# Patient Record
Sex: FEMALE | Race: OTHER | Employment: UNEMPLOYED | URBAN - METROPOLITAN AREA
[De-identification: names, ages, dates, MRNs, and addresses within clinical notes are randomized per-mention and may not be internally consistent; named-entity substitution may affect disease eponyms.]

---

## 2017-12-11 ENCOUNTER — HOSPITAL ENCOUNTER (OUTPATIENT)
Dept: MAMMOGRAPHY | Age: 42
Discharge: HOME | End: 2017-12-11

## 2017-12-11 DIAGNOSIS — Z12.31 VISIT FOR SCREENING MAMMOGRAM: ICD-10-CM

## 2017-12-11 PROCEDURE — 77067 SCR MAMMO BI INCL CAD: CPT

## 2018-12-01 ENCOUNTER — HOSPITAL ENCOUNTER (EMERGENCY)
Age: 43
Discharge: HOME | End: 2018-12-01
Attending: EMERGENCY MEDICINE
Payer: SELF-PAY

## 2018-12-01 ENCOUNTER — APPOINTMENT (OUTPATIENT)
Dept: GENERAL RADIOLOGY | Age: 43
End: 2018-12-01
Payer: SELF-PAY

## 2018-12-01 ENCOUNTER — APPOINTMENT (OUTPATIENT)
Dept: GENERAL RADIOLOGY | Age: 43
End: 2018-12-01
Attending: EMERGENCY MEDICINE
Payer: SELF-PAY

## 2018-12-01 VITALS
HEART RATE: 65 BPM | SYSTOLIC BLOOD PRESSURE: 148 MMHG | DIASTOLIC BLOOD PRESSURE: 68 MMHG | TEMPERATURE: 98.2 F | RESPIRATION RATE: 16 BRPM | OXYGEN SATURATION: 96 %

## 2018-12-01 DIAGNOSIS — S93.402A SPRAIN OF LEFT ANKLE, UNSPECIFIED LIGAMENT, INITIAL ENCOUNTER: ICD-10-CM

## 2018-12-01 DIAGNOSIS — S39.012A LUMBAR STRAIN, INITIAL ENCOUNTER: ICD-10-CM
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
## 2018-12-01 DIAGNOSIS — S83.92XA SPRAIN OF LEFT KNEE, UNSPECIFIED LIGAMENT, INITIAL ENCOUNTER: ICD-10-CM

## 2018-12-01 DIAGNOSIS — S70.02XA CONTUSION OF LEFT HIP, INITIAL ENCOUNTER: ICD-10-CM

## 2018-12-01 PROCEDURE — 72100 X-RAY EXAM L-S SPINE 2/3 VWS: CPT

## 2018-12-01 PROCEDURE — 73610 X-RAY EXAM OF ANKLE: CPT

## 2018-12-01 PROCEDURE — 73562 X-RAY EXAM OF KNEE 3: CPT

## 2018-12-01 PROCEDURE — 99283 EMERGENCY DEPT VISIT LOW MDM: CPT | Performed by: PHYSICIAN ASSISTANT

## 2018-12-01 PROCEDURE — 75810000053 HC SPLINT APPLICATION: Performed by: PHYSICIAN ASSISTANT

## 2018-12-01 PROCEDURE — 73502 X-RAY EXAM HIP UNI 2-3 VIEWS: CPT

## 2018-12-01 RX ORDER — DIFLUNISAL 500 MG/1
500 TABLET, FILM COATED ORAL 2 TIMES DAILY
Qty: 20 TAB | Refills: 0 | Status: SHIPPED | OUTPATIENT
Start: 2018-12-01 | End: 2020-12-06 | Stop reason: ALTCHOICE

## 2018-12-01 NOTE — ED NOTES
Called radiology and spoke to brendan who states he will put in to have the pt to xray first and will then send to V02.

## 2018-12-01 NOTE — ED TRIAGE NOTES
Herotainment service used for . Patient states she was picking up leaves last Saturday and fell into a hole. Reports left foot pain. States she is unable to walk. Points to ankle area and states she can feel a bone poking out. Bruising noted to left lower leg. Patient states she has pain all on left side where she fell.

## 2018-12-01 NOTE — ED NOTES
I have reviewed discharge instructions with the patient and spouse. The patient and spouse verbalized understanding. Patient left ED via Discharge Method: ambulatory to Home with . Opportunity for questions and clarification provided. Patient given 1 scripts. To continue your aftercare when you leave the hospital, you may receive an automated call from our care team to check in on how you are doing. This is a free service and part of our promise to provide the best care and service to meet your aftercare needs.  If you have questions, or wish to unsubscribe from this service please call [phone]. Thank you for Choosing our [employer] [facility] Emergency Department. Geeta Jackson

## 2018-12-01 NOTE — ED PROVIDER NOTES
Patient was raking leaves in her yard when she stepped with her left foot into a hole that she did not realize was there, twisted her ankle and fell on her left side. She has had pain to her left ankle, knee, hip and low back since then. She states she can bear weight but it is painful by the end of the day. She has noticed swelling in those areas as well. No other injuries or complaints. Her  brought her in. She is able to ambulate. The history is provided by the patient. The history is limited by a language barrier. A  was used. Ankle Pain This is a new problem. Episode onset: Last saturday, one week ago. The problem occurs constantly. The problem has been gradually worsening. The pain is present in the left knee, left ankle and left hip (low back). The quality of the pain is described as aching. The pain is at a severity of 4/10. The pain is moderate. Associated symptoms include limited range of motion, stiffness and back pain. Pertinent negatives include no numbness, no tingling, no itching and no neck pain. The symptoms are aggravated by activity. She has tried nothing for the symptoms. There has been a history of trauma. Past Medical History:  
Diagnosis Date  Endocrine disease  Hypertension No past surgical history on file. No family history on file. Social History Socioeconomic History  Marital status:  Spouse name: Not on file  Number of children: Not on file  Years of education: Not on file  Highest education level: Not on file Social Needs  Financial resource strain: Not on file  Food insecurity - worry: Not on file  Food insecurity - inability: Not on file  Transportation needs - medical: Not on file  Transportation needs - non-medical: Not on file Occupational History  Not on file Tobacco Use  Smoking status: Never Smoker Substance and Sexual Activity  Alcohol use:  No  
  Drug use: Not on file  Sexual activity: Not on file Other Topics Concern  Not on file Social History Narrative  Not on file ALLERGIES: Patient has no known allergies. Review of Systems Constitutional: Negative. HENT: Negative. Eyes: Negative. Respiratory: Negative. Cardiovascular: Negative. Gastrointestinal: Negative. Genitourinary: Negative. Musculoskeletal: Positive for back pain and stiffness. Negative for neck pain. Left hip, knee and ankle pain Skin: Negative. Negative for itching. Neurological: Negative. Negative for tingling and numbness. Psychiatric/Behavioral: Negative. All other systems reviewed and are negative. Vitals:  
 12/01/18 1614 12/01/18 1620 BP: 148/68 148/68 Pulse: 65 65 Resp: 16 16 Temp: 98.2 °F (36.8 °C) 98.2 °F (36.8 °C) SpO2: 96% 96% Physical Exam  
Constitutional: She is oriented to person, place, and time. She appears well-developed and well-nourished. HENT:  
Head: Normocephalic and atraumatic. Right Ear: External ear normal.  
Left Ear: External ear normal.  
Nose: Nose normal.  
Mouth/Throat: Oropharynx is clear and moist.  
Eyes: Conjunctivae and EOM are normal. Pupils are equal, round, and reactive to light. Neck: Normal range of motion. Neck supple. Cardiovascular: Normal rate, regular rhythm, normal heart sounds and intact distal pulses. Pulmonary/Chest: Effort normal and breath sounds normal.  
Abdominal: Soft. Bowel sounds are normal.  
Musculoskeletal:  
     Legs: 
Neurological: She is alert and oriented to person, place, and time. She has normal strength and normal reflexes. No cranial nerve deficit or sensory deficit. She displays a negative Romberg sign. GCS eye subscore is 4. GCS verbal subscore is 5. GCS motor subscore is 6. Reflex Scores: 
     Tricep reflexes are 2+ on the right side and 2+ on the left side. Bicep reflexes are 2+ on the right side and 2+ on the left side. Brachioradialis reflexes are 2+ on the right side and 2+ on the left side. Patellar reflexes are 2+ on the right side and 2+ on the left side. Achilles reflexes are 2+ on the right side and 2+ on the left side. Skin: Skin is warm and dry. Psychiatric: She has a normal mood and affect. Her behavior is normal. Judgment and thought content normal.  
Nursing note and vitals reviewed. MDM Number of Diagnoses or Management Options Amount and/or Complexity of Data Reviewed Tests in the radiology section of CPT®: ordered and reviewed Risk of Complications, Morbidity, and/or Mortality Presenting problems: moderate Diagnostic procedures: moderate Management options: moderate Patient Progress Patient progress: stable Procedures The patient was observed in the ED. Results Reviewed: XR SPINE LUMB 2 OR 3 V Final Result IMPRESSION: Negative for acute lumbar spine fracture. XR HIP LT W OR WO PELV 2-3 VWS Final Result IMPRESSION:  Negative for acute hip fracture. Mild osteoarthritis. XR KNEE LT 3 V Final Result IMPRESSION: Negative for acute fracture. XR ANKLE LT MIN 3 V Final Result IMPRESSION: No fracture, subluxation dislocation. No periostitis or erosions. Rest, ice, elevate, avoid painful activities. ED if worse. Follow up with Ortho/PCP for recheck. I discussed the results of all labs, procedures, radiographs, and treatments with the patient and available family. Treatment plan is agreed upon and the patient is ready for discharge. All voiced understanding of the discharge plan and medication instructions or changes as appropriate. Questions about treatment in the ED were answered. All were encouraged to return should symptoms worsen or new problems develop.

## 2018-12-01 NOTE — DISCHARGE INSTRUCTIONS
Distensión del glúteo: Ejercicios de rehabilitación - [ Gluteal Strain: Rehab Exercises ]  Instrucciones de cuidado  Estos son algunos ejemplos de ejercicios típicos de rehabilitación para dasilva afección. Comience cada ejercicio lentamente. Reduzca la intensidad del ejercicio si Harl Pile a sentir dolor. Dasilva médico o el fisioterapeuta le dirán cuándo puede comenzar con estos ejercicios y cuáles funcionarán mejor para usted. Cómo se hacen los ejercicios  Estiramiento del rotador de la cadera    1. Acuéstese boca arriba con ambas rodillas flexionadas y los pies apoyados sobre el piso. 2. Ponga el tobillo de la pierna afectada sobre el muslo opuesto, cerca de la rodilla. 3. Use dasilva mano para alejar suavemente del cuerpo la rodilla de la pierna afectada hasta sentir un estiramiento suave alrededor de la cadera. 4. Mantenga el estiramiento de 15 a 30 segundos. 5. Repita de 2 a 4 veces. 6. Repita los pasos del 1 al 5, marek esta vez use la mano para jalar suavemente la rodilla hacia el hombro opuesto. 7. Cambie de piernas y repita los pasos del 1 al 6, aunque solo tenga adolorida nancy cadera. Estiramiento del rotador de la cadera sentado    1. Siéntese en nancy silla firme. 2. Cruce la pierna afectada sobre la rodilla, apoyando dasilva pie en la parte superior de la rodilla. 3. Mantenga la espalda recta e inclínese lentamente hacia adelante hasta que sienta un estiramiento en la cadera. 4. Mantenga la posición entre 15 y 27 segundos. 5. Cambie de piernas y repita los pasos del 1 al 4 del otro lado. 6. Repita de 2 a 4 veces. Estiramiento del tendón de la corva o isquiotibiales (acostado)    1. Acuéstese boca arriba con las piernas estiradas. Si siente molestia en la espalda, coloque nancy toalla enrollada debajo de la parte baja de dasilva espalda.   2. Sujetando la parte posterior de la pierna afectada, levante la pierna estirada hacia arriba y [place] dasilva cuerpo hasta que sienta un estiramiento en la parte posterior del muslo.  3. Mantenga el estiramiento por lo menos hemant 30 segundos. 4. Repita de 2 a 4 veces. 5. Cambie de piernas y repita los pasos del 1 al 4, aunque solo tenga adolorida nancy cadera. Puenteo    1. Acuéstese boca arriba con ambas rodillas flexionadas. Las rodillas deben estar flexionadas aproximadamente a 90 grados. 2. A continuación presione los pies contra el piso, apriete las nalgas y [address] las caderas del suelo hasta que los hombros, las caderas y las rodillas estén todos en línea recta. 3. Mantenga la posición hemant unos 6 segundos mientras sigue respirando normalmente, y luego baje lentamente la cadera hacia el suelo y descanse un kaela de 10 segundos. 4. Repita de 8 a 12 veces. Child con nancy harris pierna    1. Acuéstese boca arriba con los brazos a los costados. 2. Flexione Felice Rosie y [address] [facility] abner pie apoyado sobre el piso. La otra pierna debe estar estirada. 3. Levante la pierna estirada para que rosemary rodilla quede al nivel de la rodilla flexionada. 4. Tense los músculos del abdomen metiendo el ombligo hacia dasilva columna vertebral. Levante las nalgas (glúteos) y procure no dejar que caigan dionisio caderas. 5. Mantenga la posición hemant unos 6 segundos mientras sigue respirando normalmente, y luego baje lentamente la cadera hacia el suelo. 6. Cambie de piernas y repita los pasos del 1 al 5.  7. Repita de 8 a 12 veces. La atención de seguimiento es nancy parte clave de dasilva tratamiento y seguridad. Asegúrese de hacer y acudir a todas las citas, y llame a dasilva médico si está teniendo problemas. También es nancy buena idea saber los resultados de dionisio exámenes y mantener nancy lista de los medicamentos que enrico. ¿Dónde puede encontrar más información en inglés? Aris James a http://renee-bright.info/. Ariel Handler V509 en la búsqueda para aprender más acerca de \"Distensión del glúteo: Ejercicios de rehabilitación - [ Gluteal Strain: Rehab Exercises ]. \"  Revisado: 29 noviembre, 2017  Versión del contenido: 11.8  © 0051-6113 Healthwise, Incorporated. Las instrucciones de cuidado fueron adaptadas bajo licencia por [employer] Connections (which disclaims liability or warranty for this information). Si usted tiene [place] afección médica o sobre estas instrucciones, siempre pregunte a dasilva profesional de jeanne. Healthwise, Incorporated niega toda garantía o responsabilidad por dasilva uso de esta información. Esguince de rodilla: Instrucciones de cuidado - [ Knee Sprain: Care Instructions ]  Instrucciones de cuidado    El esguince (torcedura) de rodilla se produce cuando albert o más ligamentos de la rodilla se estiran o se desgarran parcial o totalmente. Los ligamentos son bandas de tejido que conectan un hueso con otro y [place] [address]. La rodilla tiene cuatro ligamentos principales. Los esguinces de rodilla suelen producirse debido a nancy lesión provocada por un movimiento de torsión o flexión que ocurre al practicar deportes celina el esquí, el básquetbol, el fútbol o el fútbol americano. La rodilla gira hacia un lado mientras que la parte inferior o superior de la pierna gira en otra dirección. También puede ocurrir un esguince cuando la rodilla recibe un golpe lateral o frontal.  Si un ligamento de la rodilla está ligeramente estirado, es probable que usted solo necesite tratamiento en el hogar. Con un ligamento parcialmente desgarrado, podría necesitar nancy tablilla (férula) o un soporte ortopédico (inmovilizador). Si el ligamento se desgarró por completo, es posible que requiera [facility]. Un esguince peterson de rodilla podría tardar hasta 6 semanas en sanar, mientras que albert grave podría tardar varios meses. La atención de seguimiento es nancy parte clave de dasilva tratamiento y seguridad. Asegúrese de hacer y acudir a todas las citas, y llame a dasilva médico si está teniendo problemas.  También es nancy buena idea saber los resultados de dionisio exámenes y mantener nancy lista de los medicamentos que enrico. ¿Cómo puede cuidarse en el hogar? · Siga las instrucciones sobre cuánto peso puede colocar sobre la pierna y cómo caminar con Maureen rodrigues. · Eleve la pierna sobre nancy almohada cuando se aplica hielo o en cualquier momento que se siente o acueste hemant los 3 días siguientes. Trate de mantenerla por encima del nivel del corazón. [place] ayudará a reducir la hinchazón. · Aplíquese hielo o nancy compresa fría sobre la rodilla de 10 a 20 minutos cada vez. Trate de hacerlo cada 1 o 2 horas hemant los siguientes 3 días (cuando esté despierto) o hasta que la hinchazón baje. Póngase un paño lawrence entre el hielo y la piel. No se moje la tablilla. · Si tiene un vendaje elástico, asegúrese de que esté ajustado marek no tanto que le produzca entumecimiento, hormigueo o hinchazón en la pierna debajo del vendaje. Puede aflojar el vendaje si está demasiado apretado. · Dasilva médico puede recomendarle un soporte ortopédico (inmovilizador) para darle apoyo a la rodilla mientras sherrie. Úselo según las indicaciones. · Pregúntele a dasilva médico si puede familia un analgésico (medicamento para el dolor) de venta shola, celina acetaminofén (Tylenol), ibuprofeno (Advil, Motrin) o naproxeno (Aleve). Sea jen con los medicamentos. Shaila y siga todas las instrucciones de la [place]. ¿Cuándo debe pedir ayuda? Llame al 911 en cualquier momento que considere que necesita atención de [place].  Por ejemplo, llame si:    · Tiene dolor repentino en el pecho y falta de [place], o tose srini.    Llame a dasilva médico ahora mismo o busque atención médica inmediata si:    · El dolor ha aumentado o es muy intenso.     · No puede  el tobillo o los dedos del pie.     · El pie está frío o pálido o cambia de color.     · Siente hormigueo, debilitamiento o entumecimiento en el pie o la pierna.     · Siente que la tablilla o el soporte ortopédico le aprieta demasiado.     · No puede enderezar la rodilla o esta se le \"traba\".     · Tiene señales de un coágulo de [employer] pierna, tales celina:  ? Dolor en la pantorrilla, el muslo, la alberto o detrás de la rodilla. ? Enrojecimiento e hinchazón en la pierna.    Preste especial atención a los cambios en dasilva jeanne y asegúrese de comunicarse con dasilva médico si:    · El dolor no mejora o [place]. ¿Dónde puede encontrar más información en inglés? Franco Shaikh a http://renee-bright.info/. Escriba N406 en la búsqueda para aprender más acerca de \"Esguince de rodilla: Instrucciones de cuidado - [ Knee Sprain: Care Instructions ]. \"  Revisado: 29 noviembre, 2017  Versión del contenido: 11.8  © 8434-9647 Healthwise, Incorporated. Las instrucciones de cuidado fueron adaptadas bajo licencia por [employer] (which disclaims liability or warranty for this information). Si usted tiene [place] afección médica o sobre estas instrucciones, siempre pregunte a dasilva profesional de jeanne. Healthwise, Incorporated niega toda garantía o responsabilidad por dasilva uso de esta información. Esguince de tobillo: Instrucciones de cuidado - [ Ankle Sprain: Care Instructions ]  Instrucciones de cuidado    Un esguince de tobillo puede ocurrir cuando se tuerce el tobillo. Los ligamentos que soportan el tobillo pueden estirarse y desgarrarse. Con frecuencia el tobillo se hincha y duele. Los esguinces (torceduras) de tobillo podrían tardar de varias semanas a varios meses en sanar. Por lo general, cuanto más dolor e hinchazón tiene, más grave es el esguince de tobillo y [place] tiempo tardará en sanar. Con un buen tratamiento en el hogar, puede sanar con Berdie Pal[place] y recuperar la fuerza en el tobillo. Es muy importante darle tiempo al tobillo para que sane por completo para evitar que se vuelva a lastimar con facilidad. La atención de seguimiento es nancy parte clave de dasilva tratamiento y seguridad. Asegúrese de hacer y acudir a todas las citas, y llame a dasilva médico si está teniendo problemas.  También es Kenroy Shouts buena idea saber los resultados de dionisio exámenes y mantener nancy lista de los medicamentos que enrico. ¿Cómo puede cuidarse en el hogar? · Eleve el pie sobre almohadas tanto celina pueda hemant los siguientes 3 días. Trate de mantener el tobillo por encima del nivel del corazón. [place] ayudará a reducir la hinchazón. · [address] dasilva médico sobre el uso de nancy tablilla ([place]) o nancy venda elástica. Envolver el tobillo podría ayudarle a reducir o prevenir la hinchazón. · Es posible que dasilva médico le dé nancy tablilla, un soporte ortopédico, nancy tablilla de aire u otra forma de soporte para tobillo para protegerlo hasta que haya sanado. Úselo celina se lo indicaron mientras dasilva tobillo esté en recuperación. No se lo quite a menos que dasilva médico lo autorice. Nancy vez que dasilva tobillo haya sanado, pregúntele a dasilva médico si debe usar un soporte ortopédico cuando singh ejercicio. · Colóquese hielo o nancy compresa fría en el tobillo lesionado hemant 10 a 20 minutos cada vez. Trate de hacerlo cada 1 a 2 horas hemant los siguientes 3 días (cuando esté despierto) o hasta que la hinchazón baje. Póngase un paño lawrence entre el hielo y la piel. · Es posible que deba usar muletas hasta que pueda caminar sin dolor. Si [place] muletas, trate de poner algo de peso sobre el tobillo lesionado siempre que esto no le cause dolor. Marks ayuda al tobillo a sanar. · [employer] analgésicos (medicamentos para el dolor) exactamente según las indicaciones. ? Si el médico le recetó analgésicos, tómelos según las indicaciones. ? Si no está tomando un analgésico recetado, pregúntele a dasilva médico si puede familia albert de [employer]. · Si le indicaron ejercicios con el tobillo para hacer en el hogar, hágalos exactamente según las instrucciones. Marks puede favorecer la curación y ayudar a prevenir la debilidad duradera. ¿Cuándo debe pedir ayuda?   Llame a dasilva médico ahora mismo o busque atención médica inmediata si:    · El dolor está empeorando.     · La hinchazón está empeorando.     · Siente que la tablilla está demasiado apretada o no puede aflojarla.    Preste especial atención a los cambios en dasilva jeanne y asegúrese de comunicarse con dasilva médico si:    · No está mejorando después de 1 semana. ¿Dónde puede encontrar más información en inglés? Allison Lujan a http://renee-bright.info/. Dilia Fernandes P491 en la búsqueda para aprender más acerca de \"Esguince de tobillo: Instrucciones de cuidado - [ Ankle Sprain: Care Instructions ]. \"  Revisado: 29 noviembre, 2017  Versión del contenido: 11.8  © 2295-2718 Healthwise, Incorporated. Las instrucciones de cuidado fueron adaptadas bajo licencia por Good Help Connections (which disclaims liability or warranty for this information). Si usted tiene [place] afección médica o sobre estas instrucciones, siempre pregunte a dasilva profesional de jeanne. Healthwise, Incorporated niega toda garantía o responsabilidad por dasilva uso de esta información.

## 2020-02-24 PROCEDURE — 99284 EMERGENCY DEPT VISIT MOD MDM: CPT

## 2020-02-24 PROCEDURE — 84484 ASSAY OF TROPONIN QUANT: CPT

## 2020-02-24 PROCEDURE — 85025 COMPLETE CBC W/AUTO DIFF WBC: CPT

## 2020-02-24 PROCEDURE — 80053 COMPREHEN METABOLIC PANEL: CPT

## 2020-02-24 PROCEDURE — 87804 INFLUENZA ASSAY W/OPTIC: CPT

## 2020-02-25 ENCOUNTER — HOSPITAL ENCOUNTER (EMERGENCY)
Age: 45
Discharge: HOME | End: 2020-02-25
Attending: EMERGENCY MEDICINE
Payer: SELF-PAY

## 2020-02-25 ENCOUNTER — HOSPITAL ENCOUNTER (OUTPATIENT)
Dept: GENERAL RADIOLOGY | Age: 45
Discharge: HOME | End: 2020-02-25
Attending: EMERGENCY MEDICINE

## 2020-02-25 VITALS
RESPIRATION RATE: 18 BRPM | HEART RATE: 73 BPM | OXYGEN SATURATION: 99 % | SYSTOLIC BLOOD PRESSURE: 140 MMHG | BODY MASS INDEX: 32.5 KG/M2 | DIASTOLIC BLOOD PRESSURE: 78 MMHG | TEMPERATURE: 98.3 F | WEIGHT: 172 LBS

## 2020-02-25 DIAGNOSIS — J18.9 COMMUNITY ACQUIRED PNEUMONIA, UNSPECIFIED LATERALITY: Primary | ICD-10-CM

## 2020-02-25 LAB
ALBUMIN SERPL-MCNC: 3.7 G/DL (ref 3.5–5)
ALBUMIN/GLOB SERPL: 0.8 {RATIO} (ref 1.2–3.5)
ALP SERPL-CCNC: 85 U/L (ref 50–136)
ALT SERPL-CCNC: 82 U/L (ref 12–65)
ANION GAP SERPL CALC-SCNC: 6 MMOL/L (ref 7–16)
AST SERPL-CCNC: 47 U/L (ref 15–37)
BASOPHILS # BLD: 0 K/UL (ref 0–0.2)
BASOPHILS NFR BLD: 0 % (ref 0–2)
BILIRUB SERPL-MCNC: 0.2 MG/DL (ref 0.2–1.1)
BUN SERPL-MCNC: 11 MG/DL (ref 6–23)
CALCIUM SERPL-MCNC: 8.7 MG/DL (ref 8.3–10.4)
CHLORIDE SERPL-SCNC: 105 MMOL/L (ref 98–107)
CO2 SERPL-SCNC: 28 MMOL/L (ref 21–32)
CREAT SERPL-MCNC: 0.8 MG/DL (ref 0.6–1)
DIFFERENTIAL METHOD BLD: ABNORMAL
EOSINOPHIL # BLD: 0.1 K/UL (ref 0–0.8)
EOSINOPHIL NFR BLD: 1 % (ref 0.5–7.8)
ERYTHROCYTE [DISTWIDTH] IN BLOOD BY AUTOMATED COUNT: 13.2 % (ref 11.9–14.6)
FLUAV AG NPH QL IA: NEGATIVE
FLUBV AG NPH QL IA: NEGATIVE
GLOBULIN SER CALC-MCNC: 4.8 G/DL (ref 2.3–3.5)
GLUCOSE SERPL-MCNC: 103 MG/DL (ref 65–100)
HCT VFR BLD AUTO: 38.5 % (ref 35.8–46.3)
HGB BLD-MCNC: 12.3 G/DL (ref 11.7–15.4)
IMM GRANULOCYTES # BLD AUTO: 0 K/UL (ref 0–0.5)
IMM GRANULOCYTES NFR BLD AUTO: 0 % (ref 0–5)
LYMPHOCYTES # BLD: 4.8 K/UL (ref 0.5–4.6)
LYMPHOCYTES NFR BLD: 60 % (ref 13–44)
MCH RBC QN AUTO: 28.1 PG (ref 26.1–32.9)
MCHC RBC AUTO-ENTMCNC: 31.9 G/DL (ref 31.4–35)
MCV RBC AUTO: 88.1 FL (ref 79.6–97.8)
MONOCYTES # BLD: 0.5 K/UL (ref 0.1–1.3)
MONOCYTES NFR BLD: 6 % (ref 4–12)
NEUTS SEG # BLD: 2.6 K/UL (ref 1.7–8.2)
NEUTS SEG NFR BLD: 33 % (ref 43–78)
NRBC # BLD: 0 K/UL (ref 0–0.2)
PLATELET # BLD AUTO: 319 K/UL (ref 150–450)
PLATELET COMMENTS,PCOM: ADEQUATE
PMV BLD AUTO: 9.4 FL (ref 9.4–12.3)
POTASSIUM SERPL-SCNC: 3.9 MMOL/L (ref 3.5–5.1)
PROT SERPL-MCNC: 8.5 G/DL (ref 6.3–8.2)
RBC # BLD AUTO: 4.37 M/UL (ref 4.05–5.2)
RBC MORPH BLD: ABNORMAL
SODIUM SERPL-SCNC: 139 MMOL/L (ref 136–145)
SPECIMEN SOURCE: NORMAL
TROPONIN I SERPL-MCNC: <0.02 NG/ML (ref 0.02–0.05)
WBC # BLD AUTO: 8 K/UL (ref 4.3–11.1)
WBC MORPH BLD: ABNORMAL

## 2020-02-25 PROCEDURE — 71046 X-RAY EXAM CHEST 2 VIEWS: CPT

## 2020-02-25 RX ORDER — DOXYCYCLINE HYCLATE 100 MG
100 TABLET ORAL 2 TIMES DAILY
Qty: 14 TAB | Refills: 0 | Status: SHIPPED | OUTPATIENT
Start: 2020-02-25 | End: 2020-03-03

## 2020-02-25 NOTE — LETTER
[address] [facility] EMERGENCY DEPT 
[address] [facility] [address] 
[phone] Work/School Note Date: 2/24/2020 To Whom It May concern: 
 
Soham Patch was seen and treated today in the emergency room by the following provider(s): 
Attending Provider: Lucia Turner MD.   
 
Soham Patch may return to work on 2/27/20.  
 
Sincerely, 
 
 
 
 
Nica Taylor MD

## 2020-02-25 NOTE — PROGRESS NOTES
present for bedside registration. Agency  called to continue interpretation. Elsa Escalante [address] TM   CERTIFIED HEALTHCARE INTERPRETERTM  [email or web address]  [address] [place], [address] [facility]  [email or web address]

## 2020-02-25 NOTE — DISCHARGE INSTRUCTIONS
Patient Education        Neumonía: Instrucciones de cuidado - [ Pneumonia: Care Instructions ]  Instrucciones de cuidado    La neumonía es nancy infección de los pulmones. Chares Amabile de los casos son causados por infecciones debidas a bacterias o virus. La neumonía puede ser leve o muy grave. Si es causada por bacterias, será tratado con antibióticos. La recuperación completa de la neumonía podría familia [employer] o algunos meses, dependiendo de qué tan enfermo estuvo y si dasilva estado general de jeanne es gauthier. La atención de seguimiento es nancy parte clave de dasilva tratamiento y seguridad. Asegúrese de hacer y acudir a todas las citas, y llame a dasilva médico si está teniendo problemas. También es nancy buena idea saber los resultados de dionisio exámenes y mantener nancy lista de los medicamentos que enrico. ¿Cómo puede cuidarse en el hogar? · [address] indicaciones. No deje de tomarlos por el hecho de sentirse mejor. Debe familia todos los antibióticos hasta terminarlos. · [employer] medicamentos exactamente celina le fueron recetados. Llame a dasilva médico si parrish estar teniendo problemas con dasilva medicamento. · Descanse y duerma lo suficiente. Podría sentirse cansado y débil hemant un Catherine, marino dasilva nivel de energía mejorará con [place]. · Para prevenir la deshidratación, go abundantes líquidos, los suficientes para que dasilva orina sea de color amarillo nas o maribel celina el agua. Elija agua y otros líquidos munir sin cafeína hasta que se sienta mejor. Si tiene nancy enfermedad del riñón, del corazón o del hígado y tiene que Norma's líquidos, hable con dasilva médico antes de aumentar dasilva consumo. · Trate la tos para que pueda descansar. La tos con mucosidad (flema) de los pulmones es común con la neumonía. Es nancy de las [employer] que dasilva organismo [address].  Marino si la tos le impide descansar o le causa gran fatiga y dolor en la pared torácica, hable con dasilva [place] que tome un medicamento para aliviar la tos. · Utilice un vaporizador o humidificador para añadir humedad en dasilva habitación. Siga las indicaciones para limpiar el aparato. · No fume ni permita que otras personas fumen cerca de usted. Fumar hará que la tos dure [place]. Si necesita ayuda para dejar de fumar, hable con dasilva médico [employer] y medicamentos para dejar de fumar. Éstos pueden aumentar dionisio probabilidades de dejar el hábito para siempre. · Fabrica un analgésico (medicamento para el dolor) de venta shola, celina acetaminofén (Tylenol), ibuprofeno (Advil, Motrin) o naproxeno (Aleve). Shaila y siga todas las instrucciones de la [place]. · No tome dos o más analgésicos al mismo tiempo a menos que el médico se lo haya indicado. Muchos analgésicos contienen acetaminofén, es decir, Tylenol. El exceso de acetaminofén (Tylenol) puede ser dañino. · Si le dieron un espirómetro para medir qué tan anthony están funcionando dionisio pulmones, utilícelo celina se lo indicaron. [place] puede ayudar a dasilva médico a saber cómo va dasilva recuperación. · Para prevenir la neumonía en el futuro, hable con dasilva médico acerca de vacunarse contra la gripe (nancy vez al año) y [facility] antineumocócica (sólo nancy vez para la [address]). ¿Cuándo debe pedir ayuda? Llame al 911 en cualquier momento que considere que necesita atención de emergencia.  Por ejemplo, llame si:    · Tiene serias dificultades para respirar.    Llame a dasilva médico ahora mismo o busque atención médica inmediata si:    · Tose mucosidad (esputo) de color café oscuro o con srini.     · Tiene nueva o peor dificultad para respirar.     · Siente mareos o aturdimiento, o que está a punto de desmayarse.    Preste especial atención a los cambios en dasilva jeanne y asegúrese de comunicarse con dasilva médico si:    · Presenta fiebre o la fiebre es más hiram.     · Dasilva tos es más profunda o más frecuente que antes.     · No está mejorando después de 2 días (48 horas).     · No mejora celina se esperaba. ¿Dónde puede encontrar más información en inglés? Kallie Cordova a http://renee-bright.info/. Christiane Adolfo F815 en la búsqueda para aprender más acerca de \"Neumonía: Instrucciones de cuidado - [ Pneumonia: Care Instructions ]. \"  Revisado: 9 junio, 2019  Versión del contenido: 12.2  © 0271-1421 Healthwise, Incorporated. Las instrucciones de cuidado fueron adaptadas bajo licencia por [employer] (which disclaims liability or warranty for this information). Si usted tiene [place] afección médica o sobre estas instrucciones, siempre pregunte a dasilva profesional de jeanne. Healthwise, Incorporated niega toda garantía o responsabilidad por dasilva uso de esta información.

## 2020-02-25 NOTE — ED TRIAGE NOTES
Arrives with multiple complaints. Reports generalized body aches and fevers, onset Wednesday. Now with c/o non-productive cough, chest and back pain r/t cough, posterior head and neck pain, n/v/d, abdominal pain, blurred vision and dizziness when coughing. Attempted delsym without relief.

## 2020-02-25 NOTE — ED PROVIDER NOTES
Patient with hypertension. Presents with cough congestion sore throat and body aches since Wednesday. Getting worse so came in. The history is provided by the patient. A  was used. Cough   This is a new problem. The current episode started more than 2 days ago. The problem occurs constantly. The problem has been gradually worsening. The cough is productive of sputum. Patient reports a subjective fever - was not measured. Associated symptoms include chest pain (with cough), headaches, sore throat and myalgias. Pertinent negatives include no chills, no sweats, no eye redness, no ear pain, no rhinorrhea, no shortness of breath, no wheezing, no nausea and no vomiting. She has tried nothing for the symptoms. Past Medical History:   Diagnosis Date    Endocrine disease     Hypertension        No past surgical history on file. No family history on file.     Social History     Socioeconomic History    Marital status:      Spouse name: Not on file    Number of children: Not on file    Years of education: Not on file    Highest education level: Not on file   Occupational History    Not on file   Social Needs    Financial resource strain: Not on file    Food insecurity:     Worry: Not on file     Inability: Not on file    Transportation needs:     Medical: Not on file     Non-medical: Not on file   Tobacco Use    Smoking status: Never Smoker   Substance and Sexual Activity    Alcohol use: No    Drug use: Not on file    Sexual activity: Not on file   Lifestyle    Physical activity:     Days per week: Not on file     Minutes per session: Not on file    Stress: Not on file   Relationships    Social connections:     Talks on phone: Not on file     Gets together: Not on file     Attends Mormon service: Not on file     Active member of club or organization: Not on file     Attends meetings of clubs or organizations: Not on file     Relationship status: Not on file   Daniel Cueto Intimate partner violence:     Fear of current or ex partner: Not on file     Emotionally abused: Not on file     Physically abused: Not on file     Forced sexual activity: Not on file   Other Topics Concern    Not on file   Social History Narrative    Not on file         ALLERGIES: Patient has no known allergies. Review of Systems   Constitutional: Positive for fatigue. Negative for chills and fever. HENT: Positive for sore throat. Negative for ear pain and rhinorrhea. Eyes: Negative for pain and redness. Respiratory: Positive for cough. Negative for chest tightness, shortness of breath and wheezing. Cardiovascular: Positive for chest pain (with cough). Negative for leg swelling. Gastrointestinal: Negative for abdominal pain, diarrhea, nausea and vomiting. Genitourinary: Negative for dysuria and hematuria. Musculoskeletal: Positive for myalgias. Negative for back pain, gait problem, neck pain and neck stiffness. Skin: Negative for color change and rash. Neurological: Positive for headaches. Negative for weakness and numbness. Vitals:    02/24/20 2346   BP: 144/73   Pulse: 78   Resp: 18   Temp: 98.3 °F (36.8 °C)   SpO2: 97%   Weight: 78 kg (172 lb)            Physical Exam  Constitutional:       Appearance: She is well-developed. HENT:      Head: Normocephalic and atraumatic. Mouth/Throat:      Mouth: Mucous membranes are moist.      Pharynx: No oropharyngeal exudate or posterior oropharyngeal erythema. Neck:      Musculoskeletal: Normal range of motion and neck supple. Cardiovascular:      Rate and Rhythm: Normal rate and regular rhythm. Pulmonary:      Effort: Pulmonary effort is normal.      Breath sounds: Normal breath sounds. No wheezing. Abdominal:      General: Bowel sounds are normal.      Palpations: Abdomen is soft. Tenderness: There is no abdominal tenderness. Musculoskeletal: Normal range of motion.    Lymphadenopathy:      Cervical: No cervical adenopathy. Skin:     General: Skin is warm and dry. Neurological:      Mental Status: She is alert and oriented to person, place, and time. MDM  Number of Diagnoses or Management Options  Diagnosis management comments: Patient with pneumonia. Will treat at home. Amount and/or Complexity of Data Reviewed  Clinical lab tests: ordered and reviewed  Tests in the radiology section of CPT®: ordered and reviewed    Patient Progress  Patient progress: stable         Procedures        XR CHEST PA LAT (Final result)   Result time 02/25/20 00:31:32   Final result by Stephany Dallas MD (02/25/20 00:31:32)                Impression:    IMPRESSION:    Few small patchy opacities in the lingula, suspicious for bronchopneumonia. Narrative:    Frontal and lateral views of the chest     COMPARISON: none    INDICATION: Chest pain, cough    FINDINGS:     Few small patchy densities in the lingula. Right lung is clear. No pneumothorax,  pulmonary edema or pleural effusion. Cardiac mediastinal contour is within  normal limits. Surrounding bones are unremarkable. Results Include:    Recent Results (from the past 24 hour(s))   CBC WITH AUTOMATED DIFF    Collection Time: 02/24/20 11:58 PM   Result Value Ref Range    WBC 8.0 4.3 - 11.1 K/uL    RBC 4.37 4.05 - 5.2 M/uL    HGB 12.3 11.7 - 15.4 g/dL    HCT 38.5 35.8 - 46.3 %    MCV 88.1 79.6 - 97.8 FL    MCH 28.1 26.1 - 32.9 PG    MCHC 31.9 31.4 - 35.0 g/dL    RDW 13.2 11.9 - 14.6 %    PLATELET 261 547 - 130 K/uL    MPV 9.4 9.4 - 12.3 FL    ABSOLUTE NRBC 0.00 0.0 - 0.2 K/uL    NEUTROPHILS 33 (L) 43 - 78 %    LYMPHOCYTES 60 (H) 13 - 44 %    MONOCYTES 6 4.0 - 12.0 %    EOSINOPHILS 1 0.5 - 7.8 %    BASOPHILS 0 0.0 - 2.0 %    IMMATURE GRANULOCYTES 0 0.0 - 5.0 %    ABS. NEUTROPHILS 2.6 1.7 - 8.2 K/UL    ABS. LYMPHOCYTES 4.8 (H) 0.5 - 4.6 K/UL    ABS. MONOCYTES 0.5 0.1 - 1.3 K/UL    ABS. EOSINOPHILS 0.1 0.0 - 0.8 K/UL    ABS.  BASOPHILS 0.0 0.0 - 0.2 K/UL    ABS. IMM. GRANS. 0.0 0.0 - 0.5 K/UL    RBC COMMENTS NORMOCYTIC/NORMOCHROMIC      WBC COMMENTS OCCASIONAL      PLATELET COMMENTS ADEQUATE      DF AUTOMATED     METABOLIC PANEL, COMPREHENSIVE    Collection Time: 02/24/20 11:58 PM   Result Value Ref Range    Sodium 139 136 - 145 mmol/L    Potassium 3.9 3.5 - 5.1 mmol/L    Chloride 105 98 - 107 mmol/L    CO2 28 21 - 32 mmol/L    Anion gap 6 (L) 7 - 16 mmol/L    Glucose 103 (H) 65 - 100 mg/dL    BUN 11 6 - 23 MG/DL    Creatinine 0.80 0.6 - 1.0 MG/DL    GFR est AA >60 >60 ml/min/1.73m2    GFR est non-AA >60 >60 ml/min/1.73m2    Calcium 8.7 8.3 - 10.4 MG/DL    Bilirubin, total 0.2 0.2 - 1.1 MG/DL    ALT (SGPT) 82 (H) 12 - 65 U/L    AST (SGOT) 47 (H) 15 - 37 U/L    Alk.  phosphatase 85 50 - 136 U/L    Protein, total 8.5 (H) 6.3 - 8.2 g/dL    Albumin 3.7 3.5 - 5.0 g/dL    Globulin 4.8 (H) 2.3 - 3.5 g/dL    A-G Ratio 0.8 (L) 1.2 - 3.5     INFLUENZA A & B AG (RAPID TEST)    Collection Time: 02/24/20 11:58 PM   Result Value Ref Range    Influenza A Ag NEGATIVE  NEG      Influenza B Ag NEGATIVE  NEG      Source NASOPHARYNGEAL     TROPONIN I    Collection Time: 02/24/20 11:58 PM   Result Value Ref Range    Troponin-I, Qt. <0.02 (L) 0.02 - 0.05 NG/ML

## 2020-02-25 NOTE — PROGRESS NOTES
present for triage and x-ray. Elsa Anthony 136 TM   CERTIFIED HEALTHCARE INTERPRETERTM  [email or web address]  [address] / [place], [address] [facility]  [email or web address]. [facility]

## 2020-02-25 NOTE — ED NOTES
I have reviewed discharge instructions with the patient. The patient verbalized understanding. Patient left ED via Discharge Method: ambulatory to Home with two family members    Opportunity for questions and clarification provided. Patient given 1 scripts. Work note         To continue your aftercare when you leave the hospital, you may receive an automated call from our care team to check in on how you are doing. This is a free service and part of our promise to provide the best care and service to meet your aftercare needs.  If you have questions, or wish to unsubscribe from this service please call [phone]. Thank you for Choosing our [facility] Emergency Department.

## 2020-11-17 ENCOUNTER — TRANSCRIBE ORDER (OUTPATIENT)
Dept: SCHEDULING | Age: 45
End: 2020-11-17

## 2020-11-17 DIAGNOSIS — Z12.31 VISIT FOR SCREENING MAMMOGRAM: Primary | ICD-10-CM

## 2020-11-21 ENCOUNTER — HOSPITAL ENCOUNTER (OUTPATIENT)
Dept: MAMMOGRAPHY | Age: 45
Discharge: HOME | End: 2020-11-21
Attending: NURSE PRACTITIONER

## 2020-11-21 DIAGNOSIS — Z12.31 VISIT FOR SCREENING MAMMOGRAM: ICD-10-CM

## 2020-11-21 PROCEDURE — 77067 SCR MAMMO BI INCL CAD: CPT

## 2020-12-06 ENCOUNTER — APPOINTMENT (OUTPATIENT)
Dept: ULTRASOUND IMAGING | Age: 45
End: 2020-12-06
Attending: PHYSICIAN ASSISTANT

## 2020-12-06 ENCOUNTER — APPOINTMENT (OUTPATIENT)
Dept: CT IMAGING | Age: 45
End: 2020-12-06
Attending: PHYSICIAN ASSISTANT

## 2020-12-06 ENCOUNTER — HOSPITAL ENCOUNTER (EMERGENCY)
Age: 45
Discharge: HOME | End: 2020-12-06
Attending: EMERGENCY MEDICINE

## 2020-12-06 VITALS
OXYGEN SATURATION: 99 % | RESPIRATION RATE: 18 BRPM | SYSTOLIC BLOOD PRESSURE: 130 MMHG | TEMPERATURE: 98.4 F | DIASTOLIC BLOOD PRESSURE: 70 MMHG | HEART RATE: 80 BPM

## 2020-12-06 DIAGNOSIS — N73.0 PID (ACUTE PELVIC INFLAMMATORY DISEASE): Primary | ICD-10-CM

## 2020-12-06 LAB
ALBUMIN SERPL-MCNC: 3.8 G/DL (ref 3.5–5)
ALBUMIN/GLOB SERPL: 0.8 {RATIO} (ref 1.2–3.5)
ALP SERPL-CCNC: 95 U/L (ref 50–136)
ALT SERPL-CCNC: 62 U/L (ref 12–65)
ANION GAP SERPL CALC-SCNC: 6 MMOL/L (ref 7–16)
AST SERPL-CCNC: 27 U/L (ref 15–37)
BASOPHILS # BLD: 0 K/UL (ref 0–0.2)
BASOPHILS NFR BLD: 0 % (ref 0–2)
BILIRUB SERPL-MCNC: 0.3 MG/DL (ref 0.2–1.1)
BUN SERPL-MCNC: 16 MG/DL (ref 6–23)
CALCIUM SERPL-MCNC: 8.8 MG/DL (ref 8.3–10.4)
CHLORIDE SERPL-SCNC: 106 MMOL/L (ref 98–107)
CO2 SERPL-SCNC: 27 MMOL/L (ref 21–32)
CREAT SERPL-MCNC: 0.92 MG/DL (ref 0.6–1)
DIFFERENTIAL METHOD BLD: ABNORMAL
EOSINOPHIL # BLD: 0.1 K/UL (ref 0–0.8)
EOSINOPHIL NFR BLD: 1 % (ref 0.5–7.8)
ERYTHROCYTE [DISTWIDTH] IN BLOOD BY AUTOMATED COUNT: 13.8 % (ref 11.9–14.6)
GLOBULIN SER CALC-MCNC: 4.7 G/DL (ref 2.3–3.5)
GLUCOSE SERPL-MCNC: 141 MG/DL (ref 65–100)
HCG UR QL: NEGATIVE
HCT VFR BLD AUTO: 41.3 % (ref 35.8–46.3)
HGB BLD-MCNC: 13.3 G/DL (ref 11.7–15.4)
IMM GRANULOCYTES # BLD AUTO: 0 K/UL (ref 0–0.5)
IMM GRANULOCYTES NFR BLD AUTO: 0 % (ref 0–5)
LIPASE SERPL-CCNC: 111 U/L (ref 73–393)
LYMPHOCYTES # BLD: 3.9 K/UL (ref 0.5–4.6)
LYMPHOCYTES NFR BLD: 46 % (ref 13–44)
MCH RBC QN AUTO: 28.6 PG (ref 26.1–32.9)
MCHC RBC AUTO-ENTMCNC: 32.2 G/DL (ref 31.4–35)
MCV RBC AUTO: 88.8 FL (ref 79.6–97.8)
MONOCYTES # BLD: 0.3 K/UL (ref 0.1–1.3)
MONOCYTES NFR BLD: 3 % (ref 4–12)
NEUTS SEG # BLD: 4.2 K/UL (ref 1.7–8.2)
NEUTS SEG NFR BLD: 50 % (ref 43–78)
NRBC # BLD: 0 K/UL (ref 0–0.2)
PLATELET # BLD AUTO: 347 K/UL (ref 150–450)
PMV BLD AUTO: 9.9 FL (ref 9.4–12.3)
POTASSIUM SERPL-SCNC: 3.7 MMOL/L (ref 3.5–5.1)
PROT SERPL-MCNC: 8.5 G/DL (ref 6.3–8.2)
RBC # BLD AUTO: 4.65 M/UL (ref 4.05–5.2)
SERVICE CMNT-IMP: NORMAL
SODIUM SERPL-SCNC: 139 MMOL/L (ref 136–145)
TSH SERPL DL<=0.005 MIU/L-ACNC: 4.25 UIU/ML (ref 0.36–3.74)
WBC # BLD AUTO: 8.5 K/UL (ref 4.3–11.1)
WET PREP GENITAL: NORMAL
WET PREP GENITAL: NORMAL

## 2020-12-06 PROCEDURE — 74011000636 HC RX REV CODE- 636: Performed by: EMERGENCY MEDICINE

## 2020-12-06 PROCEDURE — 76856 US EXAM PELVIC COMPLETE: CPT

## 2020-12-06 PROCEDURE — 81003 URINALYSIS AUTO W/O SCOPE: CPT

## 2020-12-06 PROCEDURE — 74011000258 HC RX REV CODE- 258: Performed by: PHYSICIAN ASSISTANT

## 2020-12-06 PROCEDURE — 87491 CHLMYD TRACH DNA AMP PROBE: CPT

## 2020-12-06 PROCEDURE — 74177 CT ABD & PELVIS W/CONTRAST: CPT

## 2020-12-06 PROCEDURE — 74011250636 HC RX REV CODE- 250/636: Performed by: PHYSICIAN ASSISTANT

## 2020-12-06 PROCEDURE — 80053 COMPREHEN METABOLIC PANEL: CPT

## 2020-12-06 PROCEDURE — 84443 ASSAY THYROID STIM HORMONE: CPT

## 2020-12-06 PROCEDURE — 99284 EMERGENCY DEPT VISIT MOD MDM: CPT

## 2020-12-06 PROCEDURE — 85025 COMPLETE CBC W/AUTO DIFF WBC: CPT

## 2020-12-06 PROCEDURE — 83690 ASSAY OF LIPASE: CPT

## 2020-12-06 PROCEDURE — 96365 THER/PROPH/DIAG IV INF INIT: CPT

## 2020-12-06 PROCEDURE — 87210 SMEAR WET MOUNT SALINE/INK: CPT

## 2020-12-06 PROCEDURE — 74011250637 HC RX REV CODE- 250/637: Performed by: PHYSICIAN ASSISTANT

## 2020-12-06 PROCEDURE — 96375 TX/PRO/DX INJ NEW DRUG ADDON: CPT

## 2020-12-06 PROCEDURE — 74011000258 HC RX REV CODE- 258: Performed by: EMERGENCY MEDICINE

## 2020-12-06 PROCEDURE — 81025 URINE PREGNANCY TEST: CPT

## 2020-12-06 RX ORDER — KETOROLAC TROMETHAMINE 30 MG/ML
30 INJECTION, SOLUTION INTRAMUSCULAR; INTRAVENOUS
Status: COMPLETED | OUTPATIENT
Start: 2020-12-06 | End: 2020-12-06

## 2020-12-06 RX ORDER — SODIUM CHLORIDE 0.9 % (FLUSH) 0.9 %
10 SYRINGE (ML) INJECTION
Status: COMPLETED | OUTPATIENT
Start: 2020-12-06 | End: 2020-12-06

## 2020-12-06 RX ORDER — METRONIDAZOLE 500 MG/1
500 TABLET ORAL 2 TIMES DAILY
Qty: 20 TAB | Refills: 0 | Status: SHIPPED | OUTPATIENT
Start: 2020-12-06 | End: 2020-12-16

## 2020-12-06 RX ORDER — SODIUM CHLORIDE 9 MG/ML
1000 INJECTION, SOLUTION INTRAVENOUS ONCE
Status: COMPLETED | OUTPATIENT
Start: 2020-12-06 | End: 2020-12-06

## 2020-12-06 RX ORDER — KETOROLAC TROMETHAMINE 10 MG/1
10 TABLET, FILM COATED ORAL
Qty: 15 TAB | Refills: 0 | Status: SHIPPED | OUTPATIENT
Start: 2020-12-06

## 2020-12-06 RX ORDER — ONDANSETRON 4 MG/1
4 TABLET, ORALLY DISINTEGRATING ORAL
Status: COMPLETED | OUTPATIENT
Start: 2020-12-06 | End: 2020-12-06

## 2020-12-06 RX ORDER — AZITHROMYCIN 250 MG/1
1000 TABLET, FILM COATED ORAL
Status: COMPLETED | OUTPATIENT
Start: 2020-12-06 | End: 2020-12-06

## 2020-12-06 RX ORDER — DOXYCYCLINE HYCLATE 100 MG
100 TABLET ORAL 2 TIMES DAILY
Qty: 20 TAB | Refills: 0 | Status: SHIPPED | OUTPATIENT
Start: 2020-12-06 | End: 2020-12-16

## 2020-12-06 RX ADMIN — SODIUM CHLORIDE 1000 ML: 900 INJECTION, SOLUTION INTRAVENOUS at 15:21

## 2020-12-06 RX ADMIN — IOPAMIDOL 100 ML: 755 INJECTION, SOLUTION INTRAVENOUS at 14:47

## 2020-12-06 RX ADMIN — Medication 10 ML: at 14:47

## 2020-12-06 RX ADMIN — AZITHROMYCIN MONOHYDRATE 1000 MG: 250 TABLET ORAL at 15:25

## 2020-12-06 RX ADMIN — ONDANSETRON 4 MG: 4 TABLET, ORALLY DISINTEGRATING ORAL at 15:25

## 2020-12-06 RX ADMIN — SODIUM CHLORIDE 100 ML: 900 INJECTION, SOLUTION INTRAVENOUS at 14:47

## 2020-12-06 RX ADMIN — KETOROLAC TROMETHAMINE 30 MG: 30 INJECTION, SOLUTION INTRAMUSCULAR at 15:21

## 2020-12-06 RX ADMIN — CEFTRIAXONE SODIUM 1 G: 1 INJECTION, POWDER, FOR SOLUTION INTRAMUSCULAR; INTRAVENOUS at 15:21

## 2020-12-06 NOTE — ED TRIAGE NOTES
Pt ambulatory to triage. Pt states she has pelvis pain and in ovaries and numbness in both legs in the front. Denies saddle numbness, urinary IC, bowel IC. Pt states she has not had a period since 09/16/2020. Pt still has all reproductive organs and denies hx of tubal ligation. Pt has hypothyroidism and HTN. Pt states had papsmear on 09/15. Pt states also tested for uterine CA and given all clear. Pt reports nausea and constipation with fatigue. Denies vaginal d/c or irritation. Pt states last BM last night but was hard and had some rectal bleeding. Pt has hx of hemorrhoids. Pt states her last pregnancy was 21 years ago. Pt states when her daughter was 3years old her period went away and will get one or two a year.

## 2020-12-06 NOTE — ED PROVIDER NOTES
Patient is here with pelvic and ovary pain since September. She states she saw her primary care physician and had a Pap smear and was sent to a gynecologist for abnormal cells however she states they only did a telemedicine visit and she was not checked. She has not had a fever, vomiting, chest pain, shortness of breath, upper abdominal pain, dizziness, weakness, dyspnea on exertion, orthopnea, swelling/tingling or weakness to her arms or legs or other new symptoms. She did ambulate to triage without difficulty and is well-hydrated. She has a history of abnormal periods. The history is provided by the patient. The history is limited by a language barrier. A  was used Vena Deedee). Pelvic Pain    This is a new problem. The current episode started more than 1 week ago. The problem occurs constantly. The problem has been gradually worsening. The pain is located in the suprapubic region. The pain is at a severity of 8/10. The pain is moderate. Associated symptoms include nausea. Pertinent negatives include no anorexia, no fever, no belching, no diarrhea, no flatus, no hematochezia, no melena, no vomiting, no constipation, no dysuria, no frequency, no hematuria, no headaches, no arthralgias, no myalgias, no trauma and no chest pain. Nothing worsens the pain. The pain is relieved by nothing. Past Medical History:   Diagnosis Date    Endocrine disease     Hypertension     Menopause        No past surgical history on file. No family history on file.     Social History     Socioeconomic History    Marital status:      Spouse name: Not on file    Number of children: Not on file    Years of education: Not on file    Highest education level: Not on file   Occupational History    Not on file   Social Needs    Financial resource strain: Not on file    Food insecurity     Worry: Not on file     Inability: Not on file    Transportation needs     Medical: Not on file Non-medical: Not on file   Tobacco Use    Smoking status: Never Smoker   Substance and Sexual Activity    Alcohol use: No    Drug use: Not on file    Sexual activity: Not on file   Lifestyle    Physical activity     Days per week: Not on file     Minutes per session: Not on file    Stress: Not on file   Relationships    Social connections     Talks on phone: Not on file     Gets together: Not on file     Attends U[employer] service: Not on file     Active member of club or organization: Not on file     Attends meetings of clubs or organizations: Not on file     Relationship status: Not on file    Intimate partner violence     Fear of current or ex partner: Not on file     Emotionally abused: Not on file     Physically abused: Not on file     Forced sexual activity: Not on file   Other Topics Concern    Not on file   Social History Narrative    Not on file         ALLERGIES: Patient has no known allergies. Review of Systems   Constitutional: Negative. Negative for fever. HENT: Negative. Eyes: Negative. Respiratory: Negative. Cardiovascular: Negative. Negative for chest pain. Gastrointestinal: Positive for nausea. Negative for anorexia, constipation, diarrhea, flatus, hematochezia, melena and vomiting. Genitourinary: Positive for pelvic pain. Negative for dysuria, frequency and hematuria. Musculoskeletal: Negative. Negative for arthralgias and myalgias. Skin: Negative. Neurological: Negative. Negative for headaches. Psychiatric/Behavioral: Negative. All other systems reviewed and are negative. Vitals:    12/06/20 1020   BP: 134/82   Pulse: 97   Resp: 16   Temp: 98.4 °F (36.9 °C)   SpO2: 98%            Physical Exam  Vitals signs and nursing note reviewed. Exam conducted with a chaperone present. Constitutional:       Appearance: She is well-developed. HENT:      Head: Normocephalic and atraumatic.       Right Ear: External ear normal.      Left Ear: External ear normal.      Nose: Nose normal.   Eyes:      Conjunctiva/sclera: Conjunctivae normal.      Pupils: Pupils are equal, round, and reactive to light. Neck:      Musculoskeletal: Normal range of motion and neck supple. Cardiovascular:      Rate and Rhythm: Normal rate and regular rhythm. Heart sounds: Normal heart sounds. Pulmonary:      Effort: Pulmonary effort is normal.      Breath sounds: Normal breath sounds. Abdominal:      General: Bowel sounds are normal.      Palpations: Abdomen is soft. Genitourinary:     Vagina: Vaginal discharge present. Cervix: Discharge present. Uterus: Tender. Adnexa:         Right: Tenderness present. Left: Tenderness present. Comments: BIJAN Torres into assist with pelvic exam.   Musculoskeletal: Normal range of motion. Skin:     General: Skin is warm and dry. Neurological:      Mental Status: She is alert and oriented to person, place, and time. Deep Tendon Reflexes: Reflexes are normal and symmetric. Psychiatric:         Behavior: Behavior normal.         Thought Content: Thought content normal.         Judgment: Judgment normal.          MDM  Number of Diagnoses or Management Options     Amount and/or Complexity of Data Reviewed  Clinical lab tests: ordered  Tests in the radiology section of CPT®: ordered  Discuss the patient with other providers: yes (Dr. Shannan Conley  )    Risk of Complications, Morbidity, and/or Mortality  Presenting problems: moderate  Diagnostic procedures: moderate  Management options: moderate           Procedures  11:32 AM Spoke with Dr. Shannan Conley regarding patient. We discussed the history, physical exam and work-up.  1:51 PM Spoke with Dr. Herbie Cosme, radiologist who reviewed the [address]. There is good blood flow to the right ovary, there was a typo on the body of the US note. He will have it fixed. 1:54 PM Spoke with Dr. Shannan Conley again regarding patient. 2:08 PM Edna Cruz  used to speak with patient.   Patient is still in quite a bit of pain in her right lower quadrant. The ultrasound does not explain any etiology for her pain. She still has her appendix. We will scan her now. Toradol ordered for pain. IV fluids for hydration. Patient states she has no nausea. CT abd/pelvis does not show acute appendicitis. Dr. Cam Howard aware. She has 50-60 white blood cells on her wet prep so I will treat for PID. She was given IV Rocephin and Zithromax here and will be sent home on doxycycline and Flagyl. She was encouraged to drink plenty of fluids, rest, avoid intercourse for at least 10 days. She did a telemedicine visit with her gynecologist on October 28 and was told to follow-up within a week for an endometrial biopsy and has not done that so I have advised her to call tomorrow and make that appointment. Patient expresses understanding. I will send her with some Toradol for her pain. She is stable for discharge and ambulatory out of the ER without difficulty at this time. Toradol helped with pain, will send her home with that. The patient was observed in the ED. Results Reviewed:      Recent Results (from the past 24 hour(s))   CBC WITH AUTOMATED DIFF    Collection Time: 12/06/20 10:25 AM   Result Value Ref Range    WBC 8.5 4.3 - 11.1 K/uL    RBC 4.65 4.05 - 5.2 M/uL    HGB 13.3 11.7 - 15.4 g/dL    HCT 41.3 35.8 - 46.3 %    MCV 88.8 79.6 - 97.8 FL    MCH 28.6 26.1 - 32.9 PG    MCHC 32.2 31.4 - 35.0 g/dL    RDW 13.8 11.9 - 14.6 %    PLATELET 901 423 - 112 K/uL    MPV 9.9 9.4 - 12.3 FL    ABSOLUTE NRBC 0.00 0.0 - 0.2 K/uL    DF AUTOMATED      NEUTROPHILS 50 43 - 78 %    LYMPHOCYTES 46 (H) 13 - 44 %    MONOCYTES 3 (L) 4.0 - 12.0 %    EOSINOPHILS 1 0.5 - 7.8 %    BASOPHILS 0 0.0 - 2.0 %    IMMATURE GRANULOCYTES 0 0.0 - 5.0 %    ABS. NEUTROPHILS 4.2 1.7 - 8.2 K/UL    ABS. LYMPHOCYTES 3.9 0.5 - 4.6 K/UL    ABS. MONOCYTES 0.3 0.1 - 1.3 K/UL    ABS. EOSINOPHILS 0.1 0.0 - 0.8 K/UL    ABS.  BASOPHILS 0.0 0.0 - 0.2 K/UL ABS. IMM. GRANS. 0.0 0.0 - 0.5 K/UL   METABOLIC PANEL, COMPREHENSIVE    Collection Time: 12/06/20 10:25 AM   Result Value Ref Range    Sodium 139 136 - 145 mmol/L    Potassium 3.7 3.5 - 5.1 mmol/L    Chloride 106 98 - 107 mmol/L    CO2 27 21 - 32 mmol/L    Anion gap 6 (L) 7 - 16 mmol/L    Glucose 141 (H) 65 - 100 mg/dL    BUN 16 6 - 23 MG/DL    Creatinine 0.92 0.6 - 1.0 MG/DL    GFR est AA >60 >60 ml/min/1.73m2    GFR est non-AA >60 >60 ml/min/1.73m2    Calcium 8.8 8.3 - 10.4 MG/DL    Bilirubin, total 0.3 0.2 - 1.1 MG/DL    ALT (SGPT) 62 12 - 65 U/L    AST (SGOT) 27 15 - 37 U/L    Alk. phosphatase 95 50 - 136 U/L    Protein, total 8.5 (H) 6.3 - 8.2 g/dL    Albumin 3.8 3.5 - 5.0 g/dL    Globulin 4.7 (H) 2.3 - 3.5 g/dL    A-G Ratio 0.8 (L) 1.2 - 3.5     LIPASE    Collection Time: 12/06/20 10:25 AM   Result Value Ref Range    Lipase 111 73 - 393 U/L   TSH 3RD GENERATION    Collection Time: 12/06/20 10:25 AM   Result Value Ref Range    TSH 4.250 (H) 0.358 - 3.740 uIU/mL   HCG URINE, QL. - POC    Collection Time: 12/06/20 11:25 AM   Result Value Ref Range    Pregnancy test,urine (POC) Negative NEG     WET PREP    Collection Time: 12/06/20 11:26 AM    Specimen: Vagina   Result Value Ref Range    Special Requests: NO SPECIAL REQUESTS      Wet prep 30 TO 40 WBC'S SEEN PER HPF     Wet prep NO YEAST,TRICHOMONAS OR CLUE CELLS NOTED       CT ABD PELV W CONT   Final Result   IMPRESSION:    1. No evidence of appendicitis or acute abnormality by CT. 2. Fatty infiltration of liver. 3. Constipation. GI details are limited without oral contrast.      US PELV NON OB W TV   Final Result   IMPRESSION:  Left ovary not identified. Subcentimeter cysts adjacent to the   right ovary. Right ovary and uterus unremarkable. .                    I discussed the results of all labs, procedures, radiographs, and treatments with the patient and available family. Treatment plan is agreed upon and the patient is ready for discharge.   All voiced understanding of the discharge plan and medication instructions or changes as appropriate. Questions about treatment in the ED were answered. All were encouraged to return should symptoms worsen or new problems develop.

## 2020-12-06 NOTE — LETTER
[address] [facility] EMERGENCY DEPT 
[address] [facility] acute [facility] [address] 
[phone] Work/School Note Date: 12/6/2020 To Whom It May concern: 
 
Ines Fitting was seen and treated today in the emergency room by the following provider(s): 
Attending Provider: Fannie Peguero MD 
Physician Assistant: JANETH Roberto. Ines Fitting may return to work on 12/09/20. Sincerely, JANETH Chew

## 2020-12-06 NOTE — ED NOTES
I have reviewed discharge instructions with the patient. The patient verbalized understanding. Patient left ED via Discharge Method: ambulatory to Home with spouse. Opportunity for questions and clarification provided. Patient given 3 scripts. To continue your aftercare when you leave the hospital, you may receive an automated call from our care team to check in on how you are doing. This is a free service and part of our promise to provide the best care and service to meet your aftercare needs.  If you have questions, or wish to unsubscribe from this service please call [phone]. Thank you for Choosing our [facility] Emergency Department.

## 2020-12-06 NOTE — DISCHARGE INSTRUCTIONS
Patient Education        Call the gynecologist as instructed 10/28/20 to make an appointment for the endometrial biopsy. No intercourse for at least ten days. Drink plenty of water. Finish all of the antibiotics. Return to the ED sooner if worse. Enfermedad inflamatoria pélvica: Instrucciones de cuidado  Pelvic Inflammatory Disease: Care Instructions  Instrucciones de cuidado    La enfermedad inflamatoria pélvica, o EIP, es nancy infección de las trompas de [place] y otros órganos reproductores de la rocio. La EIP, por lo general, es causada por nancy infección de transmisión sexual (STI, por dionisio siglas en inglés), celina la gonorrea o la clamidia. La EIP puede causar cicatrices en las trompas de [place], lo que hace difícil que nancy rocio quede [place]. Tener nancy STI aumenta dasilva riesgo de otras STI, tales celina herpes genital, verrugas genitales, sífilis y [address]. Es importante familia todo el medicamento que le fue recetado. La EIP puede causar graves problemas de jeanne si usted no completa dasilva tratamiento. La atención de seguimiento es nancy parte clave de dasilva tratamiento y seguridad. Asegúrese de hacer y acudir a todas las citas, y llame a dasilva médico si está teniendo problemas. También es nancy buena idea saber los resultados de dionisio exámenes y mantener nancy lista de los medicamentos que enrico. ¿Cómo puede cuidarse en el hogar? · [address] No deje de tomarlos por el hecho de sentirse mejor. Debe familia todos los antibióticos hasta terminarlos. · Descanse hasta que la fiebre y el dolor hayan margie. · [employer] analgésicos (medicamentos para el dolor) exactamente según las indicaciones. ? Si el médico le recetó un analgésico, tómelo según las indicaciones. ? Si no está tomando un analgésico recetado, pregúntele a dasilva médico si puede familia albert de [employer]. · Póngase nancy almohadilla térmica (ajustada a baja temperatura) o nancy [place] sobre el vientre para el dolor.   · No use lavados vaginales. · No tenga relaciones sexuales ni use tampones (puede usar toallas sanitarias en vez de los tampones) hasta que haya tomado todos dionisio medicamentos, el dolor haya desaparecido y se sienta anthony por completo. · Hable con todas las personas con las que haya tenido relaciones sexuales en los últimos 2 meses. Tienen que [employer] pruebas y mart vez deban ser tratados por STI. Cómo prevenir las STI  · Use condones de látex cada vez que tenga relaciones sexuales. Úselos desde el inicio hasta el final del contacto sexual.  · Hable con dasilva romie antes de tener relaciones sexuales. [place] si dasilva romie tiene alguna infección de transmisión sexual (STI, por dionisio siglas en inglés) o si presenta riesgo de tenerla. Recuerde que nancy persona puede transmitir nancy STI aun cuando no tenga síntomas. · No tenga relaciones sexuales con ninguna persona que tenga síntomas de nancy STI, tales celina llagas en los genitales o la boca. · Tener nancy harris romie sexual (que no tenga STI ni relaciones sexuales con otras personas) es nancy manera buena de evitar las STI. ¿Cuándo debes pedir ayuda? Llama a tu médico ahora mismo o busca atención médica inmediata si:    · Vuelves a tener fiebre o tienes fiebre más hiram.     · Tu dolor empeora.     · Piensas que puedes estar embarazada. Presta especial atención a los cambios en tu jeanne y asegúrate de comunicarte con tu médico si:    · Vomitas o tienes diarrea.     · No mejoras después de 2 días. ¿Dónde puede encontrar más información en inglés? Vaya a http://www.gray.com/  [place] [address] en la búsqueda para aprender más acerca de \"Enfermedad inflamatoria pélvica: Instrucciones de cuidado. \"  Revisado: 8 novnitza, 3397               JIRXYQC del contenido: 12.6  © 3701-9219 [employer]. Las instrucciones de cuidado fueron adaptadas bajo licencia por [employer] (which disclaims liability or warranty for this information).  Si usted tiene [place] afección médica o sobre estas instrucciones, siempre pregunte a dasilva profesional de jeanne. [facility] niega toda garantía o responsabilidad por dasilva uso de esta información.

## 2020-12-09 LAB
C TRACH RRNA SPEC QL NAA+PROBE: NEGATIVE
N GONORRHOEA RRNA SPEC QL NAA+PROBE: NEGATIVE
SPECIMEN SOURCE: NORMAL

## 2022-09-12 ENCOUNTER — HOSPITAL ENCOUNTER (OUTPATIENT)
Dept: MAMMOGRAPHY | Age: 47
Discharge: HOME | End: 2022-09-15

## 2022-09-12 DIAGNOSIS — Z12.31 VISIT FOR SCREENING MAMMOGRAM: ICD-10-CM

## 2022-09-12 PROCEDURE — 77063 BREAST TOMOSYNTHESIS BI: CPT

## 2022-09-23 ENCOUNTER — HOSPITAL ENCOUNTER (OUTPATIENT)
Dept: MAMMOGRAPHY | Age: 47
Discharge: HOME | End: 2022-09-26

## 2022-09-23 DIAGNOSIS — R92.8 ABNORMAL SCREENING MAMMOGRAM: ICD-10-CM

## 2022-09-23 PROCEDURE — 77065 DX MAMMO INCL CAD UNI: CPT

## 2023-12-07 ENCOUNTER — TRANSCRIBE ORDERS (OUTPATIENT)
Dept: SCHEDULING | Age: 48
End: 2023-12-07

## 2023-12-07 DIAGNOSIS — Z12.31 SCREENING MAMMOGRAM FOR HIGH-RISK PATIENT: Primary | ICD-10-CM

## 2023-12-11 ENCOUNTER — HOSPITAL ENCOUNTER (OUTPATIENT)
Dept: MAMMOGRAPHY | Age: 48
Discharge: HOME | End: 2023-12-14

## 2023-12-11 VITALS — WEIGHT: 172 LBS | HEIGHT: 61 IN | BODY MASS INDEX: 32.47 KG/M2

## 2023-12-11 DIAGNOSIS — Z12.31 SCREENING MAMMOGRAM FOR HIGH-RISK PATIENT: ICD-10-CM

## 2023-12-11 PROCEDURE — 77063 BREAST TOMOSYNTHESIS BI: CPT

## 2024-01-09 ENCOUNTER — HOSPITAL ENCOUNTER (OUTPATIENT)
Dept: MAMMOGRAPHY | Age: 49
Discharge: HOME | End: 2024-01-12

## 2024-01-09 DIAGNOSIS — R92.8 ABNORMAL SCREENING MAMMOGRAM: ICD-10-CM

## 2024-01-09 PROCEDURE — G0279 TOMOSYNTHESIS, MAMMO: HCPCS

## 2024-01-09 PROCEDURE — 76642 ULTRASOUND BREAST LIMITED: CPT

## 2024-03-11 ENCOUNTER — TRANSCRIBE ORDERS (OUTPATIENT)
Dept: SCHEDULING | Age: 49
End: 2024-03-11

## 2024-03-11 DIAGNOSIS — R92.8 ABNORMAL MAMMOGRAM: Primary | ICD-10-CM

## 2024-06-03 ENCOUNTER — TRANSCRIBE ORDERS (OUTPATIENT)
Dept: SCHEDULING | Age: 49
End: 2024-06-03

## 2024-06-03 DIAGNOSIS — R92.8 ABNORMAL MAMMOGRAM: Primary | ICD-10-CM

## 2024-07-10 ENCOUNTER — HOSPITAL ENCOUNTER (OUTPATIENT)
Dept: MAMMOGRAPHY | Age: 49
Discharge: HOME | End: 2024-07-13

## 2024-07-10 VITALS — BODY MASS INDEX: 28.93 KG/M2 | HEIGHT: 66 IN | WEIGHT: 180 LBS

## 2024-07-10 DIAGNOSIS — R92.8 ABNORMAL MAMMOGRAM: ICD-10-CM

## 2024-07-10 PROCEDURE — 76642 ULTRASOUND BREAST LIMITED: CPT

## 2024-07-10 PROCEDURE — G0279 TOMOSYNTHESIS, MAMMO: HCPCS
